# Patient Record
Sex: MALE | URBAN - METROPOLITAN AREA
[De-identification: names, ages, dates, MRNs, and addresses within clinical notes are randomized per-mention and may not be internally consistent; named-entity substitution may affect disease eponyms.]

---

## 2020-08-28 ENCOUNTER — LAB ENCOUNTER (OUTPATIENT)
Dept: LAB | Age: 20
End: 2020-08-28
Attending: INTERNAL MEDICINE
Payer: COMMERCIAL

## 2020-08-28 ENCOUNTER — OFFICE VISIT (OUTPATIENT)
Dept: INTERNAL MEDICINE CLINIC | Facility: CLINIC | Age: 20
End: 2020-08-28
Payer: COMMERCIAL

## 2020-08-28 VITALS
HEIGHT: 69.5 IN | SYSTOLIC BLOOD PRESSURE: 116 MMHG | DIASTOLIC BLOOD PRESSURE: 78 MMHG | HEART RATE: 64 BPM | WEIGHT: 228 LBS | TEMPERATURE: 99 F | BODY MASS INDEX: 33.01 KG/M2

## 2020-08-28 DIAGNOSIS — Z00.00 ANNUAL PHYSICAL EXAM: ICD-10-CM

## 2020-08-28 DIAGNOSIS — Z00.00 ANNUAL PHYSICAL EXAM: Primary | ICD-10-CM

## 2020-08-28 LAB
ALBUMIN SERPL-MCNC: 4.1 G/DL (ref 3.4–5)
ALBUMIN/GLOB SERPL: 1 {RATIO} (ref 1–2)
ALP LIVER SERPL-CCNC: 105 U/L (ref 45–117)
ALT SERPL-CCNC: 127 U/L (ref 16–61)
ANION GAP SERPL CALC-SCNC: 4 MMOL/L (ref 0–18)
AST SERPL-CCNC: 41 U/L (ref 15–37)
BASOPHILS # BLD AUTO: 0.06 X10(3) UL (ref 0–0.2)
BASOPHILS NFR BLD AUTO: 0.8 %
BILIRUB SERPL-MCNC: 0.5 MG/DL (ref 0.1–2)
BUN BLD-MCNC: 16 MG/DL (ref 7–18)
BUN/CREAT SERPL: 14.4 (ref 10–20)
CALCIUM BLD-MCNC: 9.6 MG/DL (ref 8.5–10.1)
CHLORIDE SERPL-SCNC: 108 MMOL/L (ref 98–112)
CHOLEST SMN-MCNC: 169 MG/DL (ref ?–200)
CO2 SERPL-SCNC: 28 MMOL/L (ref 21–32)
CREAT BLD-MCNC: 1.11 MG/DL (ref 0.7–1.3)
DEPRECATED RDW RBC AUTO: 40.4 FL (ref 35.1–46.3)
EOSINOPHIL # BLD AUTO: 0.09 X10(3) UL (ref 0–0.7)
EOSINOPHIL NFR BLD AUTO: 1.2 %
ERYTHROCYTE [DISTWIDTH] IN BLOOD BY AUTOMATED COUNT: 13 % (ref 11–15)
EST. AVERAGE GLUCOSE BLD GHB EST-MCNC: 114 MG/DL (ref 68–126)
GLOBULIN PLAS-MCNC: 4 G/DL (ref 2.8–4.4)
GLUCOSE BLD-MCNC: 91 MG/DL (ref 70–99)
HBA1C MFR BLD HPLC: 5.6 % (ref ?–5.7)
HCT VFR BLD AUTO: 44.6 % (ref 39–53)
HDLC SERPL-MCNC: 27 MG/DL (ref 40–59)
HGB BLD-MCNC: 14.7 G/DL (ref 13–17.5)
IMM GRANULOCYTES # BLD AUTO: 0.01 X10(3) UL (ref 0–1)
IMM GRANULOCYTES NFR BLD: 0.1 %
LDLC SERPL CALC-MCNC: 72 MG/DL (ref ?–100)
LYMPHOCYTES # BLD AUTO: 2.53 X10(3) UL (ref 1–4)
LYMPHOCYTES NFR BLD AUTO: 34.4 %
M PROTEIN MFR SERPL ELPH: 8.1 G/DL (ref 6.4–8.2)
MCH RBC QN AUTO: 28.4 PG (ref 26–34)
MCHC RBC AUTO-ENTMCNC: 33 G/DL (ref 31–37)
MCV RBC AUTO: 86.3 FL (ref 80–100)
MONOCYTES # BLD AUTO: 0.47 X10(3) UL (ref 0.1–1)
MONOCYTES NFR BLD AUTO: 6.4 %
NEUTROPHILS # BLD AUTO: 4.19 X10 (3) UL (ref 1.5–7.7)
NEUTROPHILS # BLD AUTO: 4.19 X10(3) UL (ref 1.5–7.7)
NEUTROPHILS NFR BLD AUTO: 57.1 %
NONHDLC SERPL-MCNC: 142 MG/DL (ref ?–130)
OSMOLALITY SERPL CALC.SUM OF ELEC: 291 MOSM/KG (ref 275–295)
PATIENT FASTING Y/N/NP: YES
PATIENT FASTING Y/N/NP: YES
PLATELET # BLD AUTO: 283 10(3)UL (ref 150–450)
POTASSIUM SERPL-SCNC: 4.4 MMOL/L (ref 3.5–5.1)
RBC # BLD AUTO: 5.17 X10(6)UL (ref 4.3–5.7)
SODIUM SERPL-SCNC: 140 MMOL/L (ref 136–145)
TRIGL SERPL-MCNC: 351 MG/DL (ref 30–149)
VLDLC SERPL CALC-MCNC: 70 MG/DL (ref 0–30)
WBC # BLD AUTO: 7.4 X10(3) UL (ref 4–11)

## 2020-08-28 PROCEDURE — 3074F SYST BP LT 130 MM HG: CPT | Performed by: INTERNAL MEDICINE

## 2020-08-28 PROCEDURE — 85025 COMPLETE CBC W/AUTO DIFF WBC: CPT

## 2020-08-28 PROCEDURE — 83036 HEMOGLOBIN GLYCOSYLATED A1C: CPT | Performed by: INTERNAL MEDICINE

## 2020-08-28 PROCEDURE — 3078F DIAST BP <80 MM HG: CPT | Performed by: INTERNAL MEDICINE

## 2020-08-28 PROCEDURE — 36415 COLL VENOUS BLD VENIPUNCTURE: CPT

## 2020-08-28 PROCEDURE — 80061 LIPID PANEL: CPT

## 2020-08-28 PROCEDURE — 99385 PREV VISIT NEW AGE 18-39: CPT | Performed by: INTERNAL MEDICINE

## 2020-08-28 PROCEDURE — 80053 COMPREHEN METABOLIC PANEL: CPT

## 2020-08-28 PROCEDURE — 86780 TREPONEMA PALLIDUM: CPT

## 2020-08-28 PROCEDURE — 3008F BODY MASS INDEX DOCD: CPT | Performed by: INTERNAL MEDICINE

## 2020-08-28 PROCEDURE — 87389 HIV-1 AG W/HIV-1&-2 AB AG IA: CPT

## 2020-08-28 NOTE — PROGRESS NOTES
HPI:    Patient ID: Bryce Holder is a 20year old male. HPIpatient is doing well, no chest pain , no shortness of breath , no swelling, no visual changes, no bowel habits changes. Will have labs today , is fasting.      Review of Systems   Constitutional wheezes. He has no rales. He exhibits no tenderness. Abdominal: Soft. Bowel sounds are normal. He exhibits no distension and no mass. There is no tenderness. There is no guarding. Musculoskeletal:         General: No tenderness or edema.    Volney Petties

## 2020-08-31 PROBLEM — E78.1 HIGH TRIGLYCERIDES: Status: ACTIVE | Noted: 2020-08-31

## 2020-08-31 PROBLEM — R74.8 ELEVATED LIVER ENZYMES: Status: ACTIVE | Noted: 2020-08-31

## 2020-08-31 LAB — T PALLIDUM AB SER QL: NEGATIVE

## 2021-04-09 DIAGNOSIS — Z23 NEED FOR VACCINATION: ICD-10-CM

## 2024-01-27 ENCOUNTER — APPOINTMENT (OUTPATIENT)
Dept: GENERAL RADIOLOGY | Age: 24
End: 2024-01-27
Attending: PHYSICIAN ASSISTANT
Payer: COMMERCIAL

## 2024-01-27 ENCOUNTER — HOSPITAL ENCOUNTER (OUTPATIENT)
Age: 24
Discharge: HOME | End: 2024-01-27
Payer: COMMERCIAL

## 2024-01-27 VITALS
DIASTOLIC BLOOD PRESSURE: 79 MMHG | RESPIRATION RATE: 16 BRPM | HEART RATE: 74 BPM | HEIGHT: 68 IN | TEMPERATURE: 98 F | OXYGEN SATURATION: 99 % | SYSTOLIC BLOOD PRESSURE: 151 MMHG | WEIGHT: 205 LBS | BODY MASS INDEX: 31.07 KG/M2

## 2024-01-27 DIAGNOSIS — S92.101A CLOSED NONDISPLACED FRACTURE OF RIGHT TALUS, UNSPECIFIED PORTION OF TALUS, INITIAL ENCOUNTER: Primary | ICD-10-CM

## 2024-01-27 PROCEDURE — 29515 APPLICATION SHORT LEG SPLINT: CPT

## 2024-01-27 PROCEDURE — 73610 X-RAY EXAM OF ANKLE: CPT | Performed by: PHYSICIAN ASSISTANT

## 2024-01-27 PROCEDURE — 99204 OFFICE O/P NEW MOD 45 MIN: CPT

## 2024-01-27 PROCEDURE — 99213 OFFICE O/P EST LOW 20 MIN: CPT

## 2024-01-27 RX ORDER — TRAMADOL HYDROCHLORIDE 50 MG/1
50 TABLET ORAL EVERY 6 HOURS PRN
Qty: 10 TABLET | Refills: 0 | Status: SHIPPED | OUTPATIENT
Start: 2024-01-27 | End: 2024-02-01

## 2024-01-27 NOTE — ED PROVIDER NOTES
Patient Seen in: [facility]      History     Chief Complaint   Patient presents with    Fall    Leg or Foot Injury     Stated Complaint: swollen, brused right ankle    Subjective:   HPI  Trent Mariano is 23 year old male presents with acute right ankle pain x 1 day due to inversion injury after jumping out work truck.  Patient reports pain localized to lateral malleoli.  Non radiating, worsened w/ ambulation, weight bearing, and palpation.  No numbness, tingling, parasthesias, skin/ color/ temperature/ sensory changes reported.  No medications taken prior to arrival.             Objective:   History reviewed. No pertinent past medical history.           History reviewed. No pertinent surgical history.             Social History     Socioeconomic History    Marital status: Single   Occupational History    Occupation: security at [employer]   Tobacco Use    Smoking status: Never    Smokeless tobacco: Never   Substance and Sexual Activity    Alcohol use: Yes     Comment: social    Drug use: Never              Review of Systems   All other systems reviewed and are negative.      Positive for stated complaint: swollen, brused right ankle  Other systems are as noted in HPI.  Constitutional and vital signs reviewed.      All other systems reviewed and negative except as noted above.    Physical Exam     ED Triage Vitals   BP 01/27/24 1205 151/79   Pulse 01/27/24 1203 74   Resp 01/27/24 1203 16   Temp 01/27/24 1203 98.4 °F (36.9 °C)   Temp src 01/27/24 1203 Temporal   SpO2 01/27/24 1203 99 %   O2 Device 01/27/24 1203 None (Room air)       Current:/79   Pulse 74   Temp 98.4 °F (36.9 °C) (Temporal)   Resp 16   Ht 172.7 cm (5' 8\")   Wt 93 kg   SpO2 99%   BMI 31.17 kg/m²         Physical Exam  Vitals and nursing note reviewed.   Constitutional:       General: He is not in acute distress.     Appearance: Normal appearance. He is normal weight. He is not ill-appearing, toxic-appearing or diaphoretic.    HENT:      Head: Normocephalic and atraumatic.      Nose: Nose normal.   Eyes:      Extraocular Movements: Extraocular movements intact.      Pupils: Pupils are equal, round, and reactive to light.   Cardiovascular:      Rate and Rhythm: Normal rate.      Pulses: Normal pulses.   Pulmonary:      Effort: Pulmonary effort is normal.      Breath sounds: Normal breath sounds.   Musculoskeletal:         General: Swelling, tenderness and signs of injury present. No deformity. Normal range of motion.      Cervical back: Normal range of motion and neck supple.      Right lower leg: No edema.      Left lower leg: No edema.   Skin:     General: Skin is warm and dry.      Capillary Refill: Capillary refill takes less than 2 seconds.      Coloration: Skin is not jaundiced or pale.      Findings: No bruising, erythema, lesion or rash.   Neurological:      General: No focal deficit present.      Mental Status: He is alert and oriented to person, place, and time. Mental status is at baseline.   Psychiatric:         Mood and Affect: Mood normal.         Behavior: Behavior normal.         Thought Content: Thought content normal.         Judgment: Judgment normal.               ED Course   Labs Reviewed - No data to display  XR ANKLE (MIN 3 VIEWS), RIGHT (CPT=73610)   Final Result                     =====   PROCEDURE:  XR ANKLE (MIN 3 VIEWS), RIGHT (CPT=73610)       TECHNIQUE:  Three views were obtained.       COMPARISON:  None.       INDICATIONS:  swollen, brused right ankle.  rule out fracture vs    dislocation       PATIENT STATED HISTORY: (As transcribed by Technologist)  Patient states    lateral right ankle swelling and pain. Patient fell out of semi more    yesterday and landed on his right foot. Pain is worse with walking.    Shielded                 Findings:       Dorsal pulsion fracture off the talus is noted.       Joint spaces are well maintained.               IMPRESSION:       Dorsal avulsion fracture off the talus  is noted.           LOCATION:  [facility]           Dictated by (CST): Rio Gilliam MD on 1/27/2024 at 12:30 PM        Finalized by (CST): Rio Gilliam MD on 1/27/2024 at 12:31 PM           Vitals:    01/27/24 1205   BP: 151/79   Pulse:    Resp:    Temp:      Medications - No data to display  Splint/ Post Mold Application   Extremity: right ankle   Pre procedure: CMS is intact to distal toes.  Capillary refill less than 2 seconds.  Sensation intact.  DP/PT pulses intact   Cotton padding applied  4 inch orthoglass applied to RLE and held in place with 4 inch ACE wrap  Splint type:  short leg   Post procedure: CMS is intact to distal toes.  Capillary refill less than 2 seconds.  Sensation intact  Patient tolerated procedure well                   MDM                                        Medical Decision Making  23-year-old well-appearing male presents with talar fracture right lower extremity  Plan  - x ray: right ankle  - short leg posterior mold, Crutches  - OTC: Tylenol 1g po q 6 hours/ prn.   - rx: norco  5/325mg po prn breakthrough pain sparingly  - RICE Apply a compressive ACE bandage. Rest and elevate the affected painful area. Apply cold compresses intermittently as needed. As pain recedes, begin normal activities slowly as tolerated. Call if symptoms persist.  - explained to patient that if symptoms do not improve that an MRI may be warranted however that will be determined at the discretion of follow up care  - refer to orthopaedics/ PCP/ podiatry  as needed     Amount and/or Complexity of Data Reviewed  Radiology: ordered and independent interpretation performed. Decision-making details documented in ED Course.     Details: Avulsion fracture of right talus based on independent interpretation        Disposition and Plan     Clinical Impression:  1. Closed nondisplaced fracture of right talus, unspecified portion of talus, initial encounter         Disposition:  Discharge  1/27/2024 12:56  pm    Follow-up:  Maranda Quinn, THERESA  [address] [address] [phone]          Zoila Evans MD  [address] [place] [id number] [phone]          [facility]  [address] [facility] [address]  [phone]        Adams Egan MD  [address] [place] [id number] [phone]                Medications Prescribed:  Discharge Medication List as of 1/27/2024  1:04 PM        START taking these medications    Details   traMADol 50 MG Oral Tab Take 1 tablet (50 mg total) by mouth every 6 (six) hours as needed., Normal, Disp-10 tablet, R-0

## 2024-01-29 ENCOUNTER — TELEPHONE (OUTPATIENT)
Dept: ORTHOPEDICS CLINIC | Facility: CLINIC | Age: 24
End: 2024-01-29

## 2024-01-29 NOTE — TELEPHONE ENCOUNTER
Patient has an appointment scheduled with Dr. Quinn on 1/31/24 for a fractured right foot. Please advise if imaging is needed prior.

## 2024-01-29 NOTE — TELEPHONE ENCOUNTER
Can you see?  If so when?  Please advise, thank you!    DOI 1/27/24  XR 1/27/24    IMPRESSION:    Dorsal avulsion fracture off the talus is noted.

## 2024-01-31 ENCOUNTER — OFFICE VISIT (OUTPATIENT)
Dept: ORTHOPEDICS CLINIC | Facility: CLINIC | Age: 24
End: 2024-01-31
Payer: COMMERCIAL

## 2024-01-31 VITALS — HEIGHT: 68 IN | BODY MASS INDEX: 31.07 KG/M2 | WEIGHT: 205 LBS

## 2024-01-31 DIAGNOSIS — T14.8XXA AVULSION FRACTURE: ICD-10-CM

## 2024-01-31 DIAGNOSIS — S93.491A SPRAIN OF ANTERIOR TALOFIBULAR LIGAMENT OF RIGHT ANKLE, INITIAL ENCOUNTER: Primary | ICD-10-CM

## 2024-01-31 PROCEDURE — 99204 OFFICE O/P NEW MOD 45 MIN: CPT | Performed by: PODIATRIST

## 2024-01-31 PROCEDURE — 3008F BODY MASS INDEX DOCD: CPT | Performed by: PODIATRIST

## 2024-01-31 NOTE — PROGRESS NOTES
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  Sounds like an inversion type of injury with all of his weight on the foot and ankle.  He was seen at an urgent care and is here for follow-up.  He is in a Andrade splint with use of crutches and no weight on his feet.  Mild to moderate amount of pain.    No hx of sprains ankle or foot      History reviewed. No pertinent past medical history.  History reviewed. No pertinent surgical history.  Current Outpatient Medications   Medication Sig Dispense Refill    traMADol 50 MG Oral Tab Take 1 tablet (50 mg total) by mouth every 6 (six) hours as needed. (Patient not taking: Reported on 1/31/2024) 10 tablet 0     No Known Allergies  Family History   Problem Relation Age of Onset    Lipids Father      Social History     Occupational History    Occupation: security at [employer]   Tobacco Use    Smoking status: Never    Smokeless tobacco: Never   Substance and Sexual Activity    Alcohol use: Yes     Comment: social    Drug use: Never    Sexual activity: Not on file        ROS:  Complete ROS reviewed by me and non-contributory to the chief complaint except as mentioned above.    Physical Exam:    Ht 5' 8\" (1.727 m)   Wt 205 lb (93 kg)   BMI 31.17 kg/m²       Exam right lower extremity, he has a lot of tenderness at the ATF ligament and along the lateral malleolus distally.  Moderate swelling and ecchymosis noted.  No open lesions.  Tenderness as well across the dorsum of the ankle joint.  He does have full range of motion of the ankle joint.  Sensation intact to light touch of his toes.  Strength testing deferred.  Foot is warm and well-perfused.  No calf pain.    Imaging: X-rays 3 views show small  avulsion fragment off the dorsal talus on the lateral view.  Ankle mortise is clear.  Increased soft tissue density noted.  Personally viewed, independently interpreted and radiology report read.      Assessment/Diagnoses:  Diagnoses and all orders for this visit:    Sprain of anterior talofibular ligament of right ankle, initial encounter  -     DME - EXTERNAL     Avulsion fracture  -     DME - EXTERNAL         Plan:  I reviewed imaging and exam findings with the patient.  We looked at his x-rays together and discussed the avulsion fracture.  We discussed the ankle sprain as well and likely of the ATF ligament.    The type of fracture is discussed with the patient including anatomy, etiology, and location.  This can be treated conservatively with immobilization in a low profile boot.  Weight bearing status discussed -as tolerated and can shed crutch use  Time to healing up to 3-4 weeks is anticipated.  Discussed delayed healing and potential for non union, arthritis, and continued pain.    Likely with this type of injury.  More likely to experience some instability or continued pain.  Plan to reassess in 2 weeks and if he is not improving we would proceed with an MRI of the ankle at that point    Fit for a low-profile boot.  A lot of icing elevation and compression with an Ace wrap  Use of OTC NSAIDs recommended to take with food over the next 2 weeks daily.    Follow up in 2 weeks to check ankle  Likely out of work until 3 weeks post injury  May consider PT    Maranda Quinn DPM  [facility]      This document was partially prepared using Dragon [employer] voice recognition software.

## 2024-02-14 ENCOUNTER — OFFICE VISIT (OUTPATIENT)
Dept: ORTHOPEDICS CLINIC | Facility: CLINIC | Age: 24
End: 2024-02-14
Payer: COMMERCIAL

## 2024-02-14 VITALS — BODY MASS INDEX: 31.07 KG/M2 | HEIGHT: 68 IN | WEIGHT: 205 LBS

## 2024-02-14 DIAGNOSIS — T14.8XXA AVULSION FRACTURE: ICD-10-CM

## 2024-02-14 DIAGNOSIS — S93.491D SPRAIN OF ANTERIOR TALOFIBULAR LIGAMENT OF RIGHT ANKLE, SUBSEQUENT ENCOUNTER: Primary | ICD-10-CM

## 2024-02-14 PROCEDURE — 99213 OFFICE O/P EST LOW 20 MIN: CPT | Performed by: PODIATRIST

## 2024-02-14 NOTE — PROGRESS NOTES
[facility] Progress Note    Subjective:     Trent is here for follow-up of his ankle sprain and small avulsion fragment off the dorsal talus.  He is doing pretty well and has been in the boot he is about 2-1/2 weeks post injury.        Objective:   Exam right ankle  Minimal to no palpable tenderness.  Ankle is stable and equal in testing to the low left ankle.  Minimal to no swelling and full range of motion        Imaging: No new images        Assessment/Plan:     Diagnoses and all orders for this visit:    Sprain of anterior talofibular ligament of right ankle, subsequent encounter    Avulsion fracture        He is doing well and can go back to work on Monday.  He is going to get an ankle brace over-the-counter and when he does work he wears work boots that come up above the ankle so that is good stability.  If he finds, as he is getting back to work and activity that he has any instability, feeling of giving way continued pain swelling or discomfort, we will put him into physical therapy.  He may reach out for ankle rehab through [facility]    Wear brace over next 3 weeks for any high risk activities which were discussed  Follow up rm Quinn DPM  [facility]    [email or web address] speech recognition software was used to prepare this note. If a word or phrase is confusing, it is likely do to a failure of recognition. Please contact me with any questions or clarifications.

## 2024-07-19 ENCOUNTER — APPOINTMENT (OUTPATIENT)
Dept: INTERNAL MEDICINE | Age: 24
End: 2024-07-19

## 2024-07-23 ENCOUNTER — APPOINTMENT (OUTPATIENT)
Dept: INTERNAL MEDICINE | Age: 24
End: 2024-07-23

## 2024-07-26 ENCOUNTER — APPOINTMENT (OUTPATIENT)
Dept: INTERNAL MEDICINE | Age: 24
End: 2024-07-26

## (undated) NOTE — LETTER
Date: 1/31/2024    Patient Name: Trent Mariano          To Whom it may concern:    This letter has been written at the patient's request. The above patient was seen at the [facility] for treatment of a medical condition.    Trent is unable to return to work at this time, due to a medical condition. This will remain in place until 2/16/2024.    He will follow up in the office at that time, and further information as far as work status, will be given.        Sincerely,    Maranda Quinn DPM

## (undated) NOTE — LETTER
Date & Time: 1/27/2024, 1:05 PM  Patient: Trent Mariano  Encounter Provider(s):    Amando Lantigua PA-C       To Whom It May Concern:    Trent Mariano was seen and treated in our department on 1/27/2024. He should not return to work until further notice .    If you have any questions or concerns, please do not hesitate to call.        _____________________________  Physician/APC Signature

## (undated) NOTE — LETTER
Date: 2/14/2024    Patient Name: Trent Mariano          To Whom it may concern:    This letter has been written at the patient's request. The above patient was seen at the [facility] for treatment of a medical condition.    Trent is cleared to return to work on Monday 2/19/24, with no restrictions.        Sincerely,    Maranda Quinn DPM